# Patient Record
(demographics unavailable — no encounter records)

---

## 2024-11-14 NOTE — ASSESSMENT
[FreeTextEntry1] : Stable exam Crestor noted. Will get all labs. Further plans after review of studies

## 2024-11-14 NOTE — HEALTH RISK ASSESSMENT
[No] : No [No falls in past year] : Patient reported no falls in the past year [0] : 2) Feeling down, depressed, or hopeless: Not at all (0) [[id number]] : 0

## 2024-11-14 NOTE — HISTORY OF PRESENT ILLNESS
[FreeTextEntry1] : Brother in hospice with pancreatic cancer  [de-identified] : Started taking Crestor because of increased Lipids Exercise daily. Calcium noted  BP at home in lower range

## 2025-03-18 NOTE — PHYSICAL EXAM
[Well Developed] : well developed [Well Nourished] : well nourished [Normal Conjunctiva] : normal conjunctiva [Normal Venous Pressure] : normal venous pressure [No Carotid Bruit] : no carotid bruit [Normal S1, S2] : normal S1, S2 [No Murmur] : no murmur [Clear Lung Fields] : clear lung fields [Good Air Entry] : good air entry [No Respiratory Distress] : no respiratory distress  [Soft] : abdomen soft [Non Tender] : non-tender [Normal Gait] : normal gait [No Edema] : no edema [No Rash] : no rash [Moves all extremities] : moves all extremities [No Focal Deficits] : no focal deficits [Normal Speech] : normal speech [Alert and Oriented] : alert and oriented

## 2025-03-18 NOTE — DISCUSSION/SUMMARY
[FreeTextEntry1] : EKG:NSR,First Degree AVB TTE:TR reviewed labs LDL=70mg% continue crestor for HLD/calcium score; vasotec for HPTN

## 2025-04-25 NOTE — PHYSICAL EXAM
[Alert] : alert [Oriented x 3] : ~L oriented x 3 [Full Body Skin Exam Performed] : performed [FreeTextEntry3] :  AAOx3, NAD, well-appearing / pleasant Total body skin exam performed within normal limits with the exception of:  - Patient deferred examination of genitalia  Hyperkeratotic papule L vertex scalp Rough thin pink papules L frontal scalp and L temple Multiple skin toned soft fleshy nodules on the scalp and chest Symmetric brown macules and papules. No ABCD features. No concerning features on dermoscopy. Scaling in moccasin distribution on bilateral feet Well demarcated scaly plaque R palm

## 2025-04-25 NOTE — HISTORY OF PRESENT ILLNESS
[FreeTextEntry1] : RPA - mole check [de-identified] : Paul A. Goldberg 71 y/o M presents for mole check RP prev saw Dr Rojas -growth on scalp- has been treated with cryo in the past, also looks like area that was biopsied showing hyperkeratotic AK with extension to base -scaly, itchy hands- has been waxing and waning, not sure if psoriasis or eczema. 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

## 2025-04-25 NOTE — HISTORY OF PRESENT ILLNESS
[FreeTextEntry1] : RPA - mole check [de-identified] : Paul A. Goldberg 69 y/o M presents for mole check RP prev saw Dr Rojas -growth on scalp- has been treated with cryo in the past, also looks like area that was biopsied showing hyperkeratotic AK with extension to base -scaly, itchy hands- has been waxing and waning, not sure if psoriasis or eczema. 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

## 2025-04-25 NOTE — ASSESSMENT
[FreeTextEntry1] : # AKs x3- L vertex, L frontal, L temple The risks/benefits/alternatives of cryo-destruction was explained to the patient which, include but are not limited to redness, swelling, pain, blistering, scar, discoloration of skin, and recurrence. The patient expressed understanding of these risks and agreed to the procedure. 3 lesions treated with 2 cycle of LN2. The procedure was well tolerated, without complication. We have discussed related skin care. 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 SED.  # Multiple melanocytic nevi, all benign appearing on today's exam -Sun protection was discussed. The proper use of broad spectrum UVB/UVA sunscreen with SPF 30 or greater was reviewed and the need for re-application after swimming or sweating or 2-3 hours was emphasized. We discussed judicious use of clothing and avoidance of peak periods of sun exposure. I made the patient aware of need for year-round protection. ABCDEs of melanoma reviewed. -Self-skin check also reviewed -Counseled pt to monitor for changes   # Screening for skin cancer -ABCDEs of melanoma, sun safety, and self-skin check reviewed -Counseled pt to notify us of any changing or concerning lesions  RTC 6-8 weeks

## 2025-06-09 NOTE — PHYSICAL EXAM
[Alert] : alert [Oriented x 3] : ~L oriented x 3 [Full Body Skin Exam Performed] : performed [FreeTextEntry3] : Hyperkeratotic papule L vertex scalp Poorly palpable pea sized subcutaneous papule L medial eyebrow Small pink scaly patch R palmar hand

## 2025-06-09 NOTE — ASSESSMENT
[FreeTextEntry1] : # Neoplasm of uncertain behavior Location: L vertex scalp DDx: SCC vs AK Biopsy by shave The risks/benefits/alternatives of skin biopsy were explained to the patient, which include and are not limited to bleeding, infection, scarring or discoloration of skin, and recurrence of lesion. Patient expressed understanding of these risks and provided consent to the procedure. Time out with verification of patient and lesion site was performed. Site was prepped with rubbing alcohol, lidocaine with epinephrine was injected for anesthesia, and biopsy was performed. Specimen sent to path.  Procedure was without complication and well tolerated. Wound care was discussed.  # Psoriasis flaring on hands- chronic, stable -Can use clobetasol to AA bid as needed for up to 2 weeks at a time. SED.  # Small subcutaneous nodule L medial eyebrow - Stable in size over 4 years, very poorly palpable on today's exam - Disc options for management including continued monitoring, US, plastics referral- pt opts for monitoring. Will let us know if ever growing or changing  RTC prn pending bx results

## 2025-06-09 NOTE — HISTORY OF PRESENT ILLNESS
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

## 2025-07-28 NOTE — PHYSICAL EXAM
[No Acute Distress] : no acute distress [Well Nourished] : well nourished [Well Developed] : well developed [Well-Appearing] : well-appearing [Normal Sclera/Conjunctiva] : normal sclera/conjunctiva [PERRL] : pupils equal round and reactive to light [EOMI] : extraocular movements intact [Normal Outer Ear/Nose] : the outer ears and nose were normal in appearance [Normal Oropharynx] : the oropharynx was normal [No JVD] : no jugular venous distention [No Lymphadenopathy] : no lymphadenopathy [Supple] : supple [Thyroid Normal, No Nodules] : the thyroid was normal and there were no nodules present [No Respiratory Distress] : no respiratory distress  [No Accessory Muscle Use] : no accessory muscle use [Clear to Auscultation] : lungs were clear to auscultation bilaterally [Normal Rate] : normal rate  [Regular Rhythm] : with a regular rhythm [Normal S1, S2] : normal S1 and S2 [No Murmur] : no murmur heard [No Carotid Bruits] : no carotid bruits [No Abdominal Bruit] : a ~M bruit was not heard ~T in the abdomen [No Varicosities] : no varicosities [Pedal Pulses Present] : the pedal pulses are present [No Edema] : there was no peripheral edema [No Palpable Aorta] : no palpable aorta [No Extremity Clubbing/Cyanosis] : no extremity clubbing/cyanosis [Soft] : abdomen soft [Non Tender] : non-tender [Non-distended] : non-distended [No Masses] : no abdominal mass palpated [No HSM] : no HSM [Normal Bowel Sounds] : normal bowel sounds [Normal Posterior Cervical Nodes] : no posterior cervical lymphadenopathy [Normal Anterior Cervical Nodes] : no anterior cervical lymphadenopathy [No CVA Tenderness] : no CVA  tenderness [No Spinal Tenderness] : no spinal tenderness [No Joint Swelling] : no joint swelling [Grossly Normal Strength/Tone] : grossly normal strength/tone [No Rash] : no rash [Coordination Grossly Intact] : coordination grossly intact [No Focal Deficits] : no focal deficits [Normal Gait] : normal gait [Deep Tendon Reflexes (DTR)] : deep tendon reflexes were 2+ and symmetric [Normal Affect] : the affect was normal [Normal Insight/Judgement] : insight and judgment were intact [de-identified] : Lesion removed head

## 2025-07-28 NOTE — HISTORY OF PRESENT ILLNESS
[FreeTextEntry1] : Hoarse voice after talking for a long period   Had younger brother  of Pancreatic cancer. Concerned  [de-identified] : Exercise daily Meds without change

## 2025-07-28 NOTE — ASSESSMENT
[Vaccines Reviewed] : Immunizations reviewed today. Please see immunization details in the vaccine log within the immunization flowsheet.  [FreeTextEntry1] : Stable examination  All labs  No change in meds; ENT regarding hoarseness

## 2025-07-28 NOTE — HEALTH RISK ASSESSMENT
[Good] : ~his/her~  mood as  good [No] : No [No falls in past year] : Patient reported no falls in the past year [0] : 2) Feeling down, depressed, or hopeless: Not at all (0) [[id number]] : 0